# Patient Record
Sex: FEMALE | URBAN - METROPOLITAN AREA
[De-identification: names, ages, dates, MRNs, and addresses within clinical notes are randomized per-mention and may not be internally consistent; named-entity substitution may affect disease eponyms.]

---

## 2023-06-06 ENCOUNTER — APPOINTMENT (RX ONLY)
Dept: URBAN - METROPOLITAN AREA CLINIC 23 | Facility: CLINIC | Age: 38
Setting detail: DERMATOLOGY
End: 2023-06-06

## 2023-06-06 DIAGNOSIS — Z41.9 ENCOUNTER FOR PROCEDURE FOR PURPOSES OTHER THAN REMEDYING HEALTH STATE, UNSPECIFIED: ICD-10-CM

## 2023-06-06 PROCEDURE — ? BOTOX

## 2023-06-06 PROCEDURE — ? RECOMMENDATIONS

## 2023-06-06 NOTE — PROCEDURE: RECOMMENDATIONS
Render Risk Assessment In Note?: no
Detail Level: Detailed
Recommendations (Free Text): Taiwo face and neck $1,400.00 approximately 3 treatments

## 2023-06-06 NOTE — PROCEDURE: BOTOX
Additional Area 5 Units: 0
Lot #: [id number]
Post-Care Instructions: Patient instructed to not lie down for 4 hours and limit physical activity for 24 hours. Patient instructed not to travel by airplane for 48 hours.
Show Additional Area 5: Yes
Additional Area 4 Location: high forehead
Show Ucl Units: No
Expiration Date (Month Year): 2024/05
Forehead Units: [address]
Additional Area 6 Location: Karsten waller
Periorbital Skin Units: [address]
Additional Area 3 Location: lateral eyes
Dilution (U/0.1 Cc): 2.5
Detail Level: Detailed
Additional Area 2 Location: neckbands
Additional Area 5 Location: lateral brows
Additional Area 1 Location: lat brows
Incrementing Botox Units: By 0.5 Units
Show Inventory Tab: Show
Glabellar Complex Units: 20
Consent: Written consent obtained. Risks include but not limited to lid/brow ptosis, bruising, swelling, diplopia, temporary effect, incomplete chemical denervation.

## 2023-10-06 ENCOUNTER — APPOINTMENT (RX ONLY)
Dept: URBAN - METROPOLITAN AREA CLINIC 23 | Facility: CLINIC | Age: 38
Setting detail: DERMATOLOGY
End: 2023-10-06

## 2023-10-06 DIAGNOSIS — Z41.9 ENCOUNTER FOR PROCEDURE FOR PURPOSES OTHER THAN REMEDYING HEALTH STATE, UNSPECIFIED: ICD-10-CM

## 2023-10-06 PROCEDURE — ? IN-HOUSE DISPENSING PHARMACY

## 2023-10-06 PROCEDURE — ? DYSPORT

## 2023-10-06 NOTE — PROCEDURE: IN-HOUSE DISPENSING PHARMACY
Product 21 Amount/Unit (Numbers Only): 1
Product 57 Price/Unit (In Dollars): 0
Product 11 Amount/Unit (Numbers Only): 3
Product 37 Unit Type: mg
Product 14 Amount/Unit (Numbers Only): [address]
Product 24 Application Directions: Apply pea size amount to entire face at bedtime only.
Send Charges To Patient Encounter: No
Product 5 Refills: 2
Product 3 Application Directions: Apply to face daily as indicated
Name Of Product 7: Mark Cochran
Name Of Product 19: Lina Bishop
Product 5 Amount/Unit (Numbers Only): [address] [facility]
Product 16 Application Directions: Apply to lashes at bedtime only
Product 13 Application Directions: Apply to wound site every 48 hours as indicated.
Product 3 Unit Type: jar(s)
Product 8 Unit Type: tablets
Name Of Product 25: Valium 5mg
Product 20 Unit Type: bottle(s)
Product 23 Amount/Unit (Numbers Only): 5
Name Of Product 4: Latisse 5 ml
Product 13 Unit Type: unit(s)
Product 16 Unit Type: ml
Product 7 Application Directions: Apply one drop in each eye
Product 19 Application Directions: Apply thin layer at bedtime to melasma.
Name Of Product 9: Brightening pads 6%
Name Of Product 1: Prednisone 20 mg
Product 4 Price/Unit (In Dollars): 0.00
Product 15 Application Directions: Apply to affected skin as indicated
Product 12 Application Directions: Apply to the entire face in the Am and Pm
Product 2 Unit Type: tube(s)
Name Of Product 24: Tretinoin 0.05% cream
Product 18 Application Directions: Mix with cerave cream and apply to body daily after shower
Name Of Product 3: Hydroquinone brightening pads 6%
Product 6 Application Directions: Take 1 pill by mouth today,and another tomorrow
Detail Level: Detailed
Product 1 Amount/Unit (Numbers Only): 10
Name Of Product 13: Duoderm thin
Name Of Product 16: Latisse
Product 13 Price/Unit (In Dollars): $10.00
Product 17 Application Directions: Take one tablet 20 mg today in office and 10 mg tomorrow if needed for swelling. Dispense in office.
Product 8 Application Directions: Take 1 tablet before the procedure by mouth
Name Of Product 26: Tylenol 500mg
Product 5 Application Directions: Apply one drop to each eye
Name Of Product 2: Tretinoin 0.05%
Product 20 Application Directions: Apply thin layer at bedtime to entire face
Name Of Product 10: Christina Molina
Name Of Product 22: Calcipotriene/ 5 FLuoracil cream
Name Of Product 12: Skin Better Even tone Serum
Name Of Product 15: Lang Flair
Product 23 Application Directions: Take by mouth.
Product 5 Unit Type: grams
Name Of Product 18: Tretinoin cream 0.05%
Name Of Product 6: Prednisone 10 mg
Product 10 Application Directions: Apply to face once daily
Product 2 Application Directions: Apply a pea size amount to Entire face at bedtime
Name Of Product 14: Valtrex 1 gram
Product 22 Application Directions: Apply small amount to temple and forehead twice daily as indicated x 3 or 5 days
Product 7 Unit Type: kit(s)
Product 26 Application Directions: Take one pill by mouth
Product 15 Amount/Unit (Numbers Only): [address]
Product 4 Application Directions: Apply to Eyelids at bedtime
Name Of Product 8: Valium 5 mg
Name Of Product 20: Cameron Juan
Name Of Product 5: Tenisha Dewey
Product 1 Application Directions: Take one tablet today after procedure for swelling and one tablet tomorrow as indicated with food and water.
Product 24 Price/Unit (In Dollars): [facility] [facility]
Product 9 Application Directions: Apply to affected area on the face area am as indicated.
Product 18 Amount/Unit (Numbers Only): 20
Product 21 Application Directions: Apply pea size amount to entire face at bedtime only
Product 11 Application Directions: Take one tablet prior to procedure and one tablet daily x 3 days
Product 14 Application Directions: Take one tablet prior to procedure. Continue twice daily x 3 days as indicated.
Name Of Product 23: Diazepam (Valium)

## 2023-10-06 NOTE — PROCEDURE: DYSPORT
Show Right And Left Brow Units: No
Consent: Written consent obtained. Risks include but not limited to lid/brow ptosis, bruising, swelling, diplopia, temporary effect, incomplete chemical denervation.
Glabellar Complex Units: [address]
Additional Area 1 Location: glabella
Show Glabellar Units: Yes
Dilution (U/ 0.1cc): 1.5
R Brow Units: 0
Show Inventory Tab: Show
Post-Care Instructions: Patient instructed to not lie down for 4 hours, limit physical activity for 24 hours and avoid manipulation of the treated areas.
Additional Area 2 Location: lateral eyes
Expiration Date (Month Year): 2022-10
Additional Area 6 Location: lip flip
Periorbital Skin Units: 50
Additional Area 3 Location: high forehead
Lot #: [id number]
Detail Level: Zone
Additional Area 4 Location: neckband

## 2024-02-15 ENCOUNTER — APPOINTMENT (RX ONLY)
Dept: URBAN - METROPOLITAN AREA CLINIC 98 | Facility: CLINIC | Age: 39
Setting detail: DERMATOLOGY
End: 2024-02-15

## 2024-02-15 DIAGNOSIS — Z41.9 ENCOUNTER FOR PROCEDURE FOR PURPOSES OTHER THAN REMEDYING HEALTH STATE, UNSPECIFIED: ICD-10-CM

## 2024-02-15 PROCEDURE — ? BOTOX

## 2024-02-15 NOTE — PROCEDURE: BOTOX
Additional Area 3 Units: 0
Show Glabellar Units: Yes
Post-Care Instructions: Patient instructed to not lie down for 4 hours and limit physical activity for 24 hours. Patient instructed not to travel by airplane for 48 hours.
Additional Area 2 Location: masseter
Detail Level: Detailed
Additional Area 5 Location: axillas
Expiration Date (Month Year): 2024/05
Glabellar Complex Units: 20
Lot #: [id number]
Show Right And Left Pupillary Line Units: No
Additional Area 1 Location: right eyebrow
Incrementing Botox Units: By 0.5 Units
Show Inventory Tab: Show
Dilution (U/0.1 Cc): 2.5
Additional Area 6 Location: darrin waller
Additional Area 4 Location: lateral brows
Forehead Units: 22
Periorbital Skin Units: 12
Consent: Written consent obtained. Risks include but not limited to lid/brow ptosis, bruising, swelling, diplopia, temporary effect, incomplete chemical denervation.
Additional Area 3 Location: lateral eyes

## 2024-05-22 ENCOUNTER — APPOINTMENT (RX ONLY)
Dept: URBAN - METROPOLITAN AREA CLINIC 23 | Facility: CLINIC | Age: 39
Setting detail: DERMATOLOGY
End: 2024-05-22

## 2024-05-22 DIAGNOSIS — Z41.9 ENCOUNTER FOR PROCEDURE FOR PURPOSES OTHER THAN REMEDYING HEALTH STATE, UNSPECIFIED: ICD-10-CM

## 2024-05-22 PROCEDURE — ? BOTOX

## 2024-05-22 NOTE — PROCEDURE: BOTOX
Show Right And Left Pupillary Line Units: No
Show Periorbital Units: Yes
Glabellar Complex Units: 0
Additional Area 1 Units: 4
Additional Area 6 Location: darrin waller
Show Inventory Tab: Show
Additional Area 2 Location: upper lip
Dilution (U/0.1 Cc): 2.5
Periorbital Skin Units: 12
Incrementing Botox Units: By 0.5 Units
Additional Area 5 Location: axillas
Additional Area 4 Location: lateral brows
Consent: Written consent obtained. Risks include but not limited to lid/brow ptosis, bruising, swelling, diplopia, temporary effect, incomplete chemical denervation.
Forehead Units: 22
Detail Level: Detailed
Post-Care Instructions: Patient instructed to not lie down for 4 hours and limit physical activity for 24 hours. Patient instructed not to travel by airplane for 48 hours.
Glabellar Complex Units: 20
Lot #: [id number]
Expiration Date (Month Year): 2024/05

## 2024-09-25 ENCOUNTER — RX ONLY (OUTPATIENT)
Age: 39
Setting detail: RX ONLY
End: 2024-09-25

## 2024-09-25 ENCOUNTER — APPOINTMENT (RX ONLY)
Dept: URBAN - METROPOLITAN AREA CLINIC 23 | Facility: CLINIC | Age: 39
Setting detail: DERMATOLOGY
End: 2024-09-25

## 2024-09-25 DIAGNOSIS — Z41.9 ENCOUNTER FOR PROCEDURE FOR PURPOSES OTHER THAN REMEDYING HEALTH STATE, UNSPECIFIED: ICD-10-CM

## 2024-09-25 PROCEDURE — ? RECOMMENDATIONS

## 2024-09-25 PROCEDURE — ? BOTOX

## 2024-09-25 NOTE — PROCEDURE: RECOMMENDATIONS
Render Risk Assessment In Note?: no
Detail Level: Zone
Recommendations (Free Text): [place] laser for melasma full face, quoted patient $700 per treatment\\n[employer] vitamin C twice daily, at night time, follow with Hy-Glow compound from [employer] for 6 weeks
(3) adequate

## 2024-09-25 NOTE — PROCEDURE: BOTOX
Periorbital Skin Units: 0
Show Additional Area 3: Yes
Additional Area 5 Location: axillas
Show Inventory Tab: Show
Additional Area 1 Location: bunny lines
Show Right And Left Periorbital Units: No
Additional Area 4 Location: trapezoids
Additional Area 1 Units: 4
Forehead Units: 22
Consent: Written consent obtained. Risks include but not limited to lid/brow ptosis, bruising, swelling, diplopia, temporary effect, incomplete chemical denervation.
Glabellar Complex Units: 20
Lot #: [id number]
Additional Area 3 Location: neck bands
Expiration Date (Month Year): 2026/09
Post-Care Instructions: Patient instructed to not lie down for 4 hours and limit physical activity for 24 hours. Patient instructed not to travel by airplane for 48 hours.
Additional Area 2 Location: lateral brows
Detail Level: Detailed
Incrementing Botox Units: By 0.5 Units
Dilution (U/0.1 Cc): 1.2
Periorbital Skin Units: 12

## 2024-12-17 ENCOUNTER — APPOINTMENT (OUTPATIENT)
Dept: URBAN - METROPOLITAN AREA CLINIC 23 | Facility: CLINIC | Age: 39
Setting detail: DERMATOLOGY
End: 2024-12-17

## 2024-12-17 DIAGNOSIS — Z41.9 ENCOUNTER FOR PROCEDURE FOR PURPOSES OTHER THAN REMEDYING HEALTH STATE, UNSPECIFIED: ICD-10-CM

## 2024-12-17 PROCEDURE — ? BOTOX

## 2024-12-17 NOTE — PROCEDURE: BOTOX
R Brow Units: 0
Glabellar Complex Units: 20
Additional Area 5 Location: axillas
Consent: Written consent obtained. Risks include but not limited to lid/brow ptosis, bruising, swelling, diplopia, temporary effect, incomplete chemical denervation.
Show Ucl Units: No
Show Masseter Units: Yes
Dilution (U/0.1 Cc): 1.2
Detail Level: Detailed
Additional Area 1 Units: 4
Post-Care Instructions: Patient instructed to not lie down for 4 hours and limit physical activity for 24 hours. Patient instructed not to travel by airplane for 48 hours.
Additional Area 4 Location: Neck
Additional Area 3 Location: Colum[place]
Show Inventory Tab: Show
Periorbital Skin Units: 12
Incrementing Botox Units: By 0.5 Units
Lot #: [id number]
Additional Area 2 Location: Upper lip
Forehead Units: 22
Expiration Date (Month Year): 2026/09
Additional Area 1 Location: bunny lines
Additional Area 6 Location: chin

## 2025-04-25 ENCOUNTER — APPOINTMENT (OUTPATIENT)
Dept: URBAN - METROPOLITAN AREA CLINIC 23 | Facility: CLINIC | Age: 40
Setting detail: DERMATOLOGY
End: 2025-04-25

## 2025-04-25 DIAGNOSIS — Z41.9 ENCOUNTER FOR PROCEDURE FOR PURPOSES OTHER THAN REMEDYING HEALTH STATE, UNSPECIFIED: ICD-10-CM

## 2025-04-25 PROCEDURE — ? BOTOX

## 2025-04-25 PROCEDURE — ? RECOMMENDATIONS

## 2025-04-25 NOTE — PROCEDURE: RECOMMENDATIONS
Recommendations (Free Text): A-Illuminate at bedtime mixed with Hyglow from [employer]\\nCoolpeel face\\nSofwave with Katey 
Detail Level: Zone
Render Risk Assessment In Note?: no

## 2025-04-25 NOTE — PROCEDURE: BOTOX
R Brow Units: 0
Glabellar Complex Units: 20
Additional Area 5 Location: axillas
Consent: Written consent obtained. Risks include but not limited to lid/brow ptosis, bruising, swelling, diplopia, temporary effect, incomplete chemical denervation.
Show Ucl Units: No
Show Masseter Units: Yes
Dilution (U/0.1 Cc): 1.2
Detail Level: Detailed
Additional Area 1 Units: 4
Post-Care Instructions: Patient instructed to not lie down for 4 hours and limit physical activity for 24 hours. Patient instructed not to travel by airplane for 48 hours.
Additional Area 4 Location: Neck
Additional Area 3 Location: Columella
Show Inventory Tab: Show
Periorbital Skin Units: 12
Incrementing Botox Units: By 0.5 Units
Lot #: [id number]
Additional Area 2 Location: Upper lip
Forehead Units: 22
Expiration Date (Month Year): 2026/09
Additional Area 1 Location: bunny lines
Additional Area 6 Location: chin